# Patient Record
Sex: MALE | Race: WHITE | NOT HISPANIC OR LATINO | Employment: FULL TIME | URBAN - METROPOLITAN AREA
[De-identification: names, ages, dates, MRNs, and addresses within clinical notes are randomized per-mention and may not be internally consistent; named-entity substitution may affect disease eponyms.]

---

## 2017-12-16 ENCOUNTER — OFFICE VISIT (OUTPATIENT)
Dept: URGENT CARE | Facility: URGENT CARE | Age: 32
End: 2017-12-16
Payer: COMMERCIAL

## 2017-12-16 ENCOUNTER — RADIANT APPOINTMENT (OUTPATIENT)
Dept: GENERAL RADIOLOGY | Facility: CLINIC | Age: 32
End: 2017-12-16
Attending: FAMILY MEDICINE
Payer: COMMERCIAL

## 2017-12-16 VITALS
TEMPERATURE: 98.4 F | DIASTOLIC BLOOD PRESSURE: 70 MMHG | WEIGHT: 170 LBS | OXYGEN SATURATION: 96 % | SYSTOLIC BLOOD PRESSURE: 126 MMHG | BODY MASS INDEX: 22.53 KG/M2 | HEART RATE: 88 BPM | HEIGHT: 73 IN

## 2017-12-16 DIAGNOSIS — S93.401A SPRAIN OF RIGHT ANKLE, UNSPECIFIED LIGAMENT, INITIAL ENCOUNTER: Primary | ICD-10-CM

## 2017-12-16 PROCEDURE — 99213 OFFICE O/P EST LOW 20 MIN: CPT | Performed by: FAMILY MEDICINE

## 2017-12-16 PROCEDURE — 73610 X-RAY EXAM OF ANKLE: CPT | Mod: RT

## 2017-12-16 NOTE — NURSING NOTE
"Jesus Manuel Morgan;   Chief Complaint   Patient presents with     Musculoskeletal Problem     slipped on ice this am, bend right foot back with all his weight on foot, pain in foot,ankle and up lower leg      Urgent Care     Initial /70 (BP Location: Right arm, Patient Position: Chair, Cuff Size: Adult Regular)  Pulse 88  Temp 98.4  F (36.9  C) (Oral)  Ht 6' 1\" (1.854 m)  Wt 170 lb (77.1 kg)  SpO2 96%  BMI 22.43 kg/m2 Estimated body mass index is 22.43 kg/(m^2) as calculated from the following:    Height as of this encounter: 6' 1\" (1.854 m).    Weight as of this encounter: 170 lb (77.1 kg)..  BP completed using cuff size regular.  Мария Ceron R.N.  "

## 2017-12-16 NOTE — PROGRESS NOTES
Subjective: Patient slipped on the ice and his right ankle got caught under him in excess plantar flexion, could not bear weight afterwards, lateral malleolar pain.    Objective: He has good range of motion in the foot and he can move the toes without difficulty or pain but eversion of the ankle is painful and lateral malleolus is moderately swollen.  The ankle joint appears to be stable.  X-rays do not show any fracture.  They will be read by radiology as well    Assessment and plan: Right ankle sprain.  Nonweightbearing until he can start bearing some weight, cast boot could be helpful.His high-top sneakers are ok for now

## 2017-12-16 NOTE — MR AVS SNAPSHOT
"              After Visit Summary   2017    Jesus Manuel Morgan    MRN: [id number]           Patient Information     Date Of Birth          1985        Visit Information        Provider Department      2017 3:20 PM Pradip Patel MD [facility]        Today's Diagnoses     Sprain of right ankle, unspecified ligament, initial encounter    -  1       Follow-ups after your visit        Who to contact     If you have questions or need follow up information about today's clinic visit or your schedule please contact [facility] directly at [phone].  Normal or non-critical lab and imaging results will be communicated to you by Chamatehart, letter or phone within 4 business days after the clinic has received the results. If you do not hear from us within 7 days, please contact the clinic through Chamatehart or phone. If you have a critical or abnormal lab result, we will notify you by phone as soon as possible.  Submit refill requests through sambaash or call your pharmacy and they will forward the refill request to us. Please allow 3 business days for your refill to be completed.          Additional Information About Your Visit        MyChart Information     sambaash lets you send messages to your doctor, view your test results, renew your prescriptions, schedule appointments and more. To sign up, go to [email or web address] . Click on \"Log in\" on the left side of the screen, which will take you to the Welcome page. Then click on \"Sign up Now\" on the right side of the page.     You will be asked to enter the access code listed below, as well as some personal information. Please follow the directions to create your username and password.     Your access code is: [email or web address]  Expires: 3/16/2018  3:57 PM     Your access code will  in 90 days. If you need help or a new code, please call your [facility] clinic or [phone].        Care EveryWhere ID     " "This is your Care EveryWhere ID. This could be used by other organizations to access your [facility] medical records  [id number]        Your Vitals Were     Pulse Temperature Height Pulse Oximetry BMI (Body Mass Index)       88 98.4  F (36.9  C) (Oral) 6' 1\" (1.854 m) 96% 22.43 kg/m2        Blood Pressure from Last 3 Encounters:   12/16/17 126/70   10/30/16 120/59   10/27/14 134/74    Weight from Last 3 Encounters:   12/16/17 170 lb (77.1 kg)   10/30/16 171 lb 9 oz (77.8 kg)   10/27/14 170 lb (77.1 kg)              We Performed the Following     XR Ankle Right G/E 3 Views          Today's Medication Changes          These changes are accurate as of: 12/16/17  3:57 PM.  If you have any questions, ask your nurse or doctor.               Stop taking these medicines if you haven't already. Please contact your care team if you have questions.     fluticasone 50 MCG/ACT spray   Commonly known as:  FLONASE   Stopped by:  Pradip Patel MD                    Primary Care Provider Fax #    Physician No Ref-Primary [phone]       No address on file        Equal Access to Services     HANNA KAUFFMAN AH: Billy Zarate, wajarvis priest, qasusan kaalmakaruna rendon, zaira gupta. So [facility] [phone].    ATENCIÓN: Si habla español, tiene a guerrero disposición servicios gratuitos de asistencia lingüística. Shira al [phone].    We comply with applicable federal civil rights laws and [place] laws. We do not discriminate on the basis of race, color, national origin, age, disability, sex, sexual orientation, or gender identity.            Thank you!     Thank you for choosing [facility]  for your care. Our goal is always to provide you with excellent care. Hearing back from our patients is one way we can continue to improve our services. Please take a few minutes to complete the written survey that you may receive in the mail after your visit with us. Thank " you!             Your Updated Medication List - Protect others around you: Learn how to safely use, store and throw away your medicines at www.disposemymeds.org.      Notice  As of 12/16/2017  3:57 PM    You have not been prescribed any medications.

## 2021-07-23 ENCOUNTER — OFFICE VISIT (OUTPATIENT)
Dept: URGENT CARE | Facility: URGENT CARE | Age: 36
End: 2021-07-23
Payer: COMMERCIAL

## 2021-07-23 VITALS
SYSTOLIC BLOOD PRESSURE: 130 MMHG | DIASTOLIC BLOOD PRESSURE: 86 MMHG | HEIGHT: 73 IN | BODY MASS INDEX: 26.51 KG/M2 | OXYGEN SATURATION: 99 % | WEIGHT: 200 LBS | HEART RATE: 61 BPM | TEMPERATURE: 98.4 F

## 2021-07-23 DIAGNOSIS — N50.812 TESTICULAR PAIN, LEFT: Primary | ICD-10-CM

## 2021-07-23 LAB
ALBUMIN UR-MCNC: NEGATIVE MG/DL
APPEARANCE UR: CLEAR
BILIRUB UR QL STRIP: NEGATIVE
COLOR UR AUTO: YELLOW
GLUCOSE UR STRIP-MCNC: NEGATIVE MG/DL
HGB UR QL STRIP: NEGATIVE
KETONES UR STRIP-MCNC: NEGATIVE MG/DL
LEUKOCYTE ESTERASE UR QL STRIP: NEGATIVE
NITRATE UR QL: NEGATIVE
PH UR STRIP: 6.5 [PH] (ref 5–7)
SP GR UR STRIP: 1.01 (ref 1–1.03)
UROBILINOGEN UR STRIP-ACNC: 0.2 E.U./DL

## 2021-07-23 PROCEDURE — 99203 OFFICE O/P NEW LOW 30 MIN: CPT | Performed by: FAMILY MEDICINE

## 2021-07-23 PROCEDURE — 81003 URINALYSIS AUTO W/O SCOPE: CPT | Performed by: FAMILY MEDICINE

## 2021-07-23 ASSESSMENT — MIFFLIN-ST. JEOR: SCORE: 1891.07

## 2021-07-24 NOTE — PROGRESS NOTES
Continue current management   F/U INR in 2 weeks Subjective: Patient reports that starting a couple of hours ago out of the blue he started having intermittent stabbing pains in the left testicle that would be quite momentary, sometimes sort of dull and sometimes quite sharp, not related to movement, has never had this before, and something feels a little odd when he urinates but he would not call it pain.  He has no risk for STDs.  Exercises biking but nothing unusual lately    Objective: Genital exam is quite normal, testicles not tender right now, no hernia felt.  Urine is normal.    Assessment and plan: Intermittent left testicular pain out of the blue, no obvious explanation.  Testicular torsion seems very unlikely, epididymitis seems very unlikely, start of her hernia seems unlikely as well.  I think he should take some ibuprofen and monitor symptoms for a while.  If it goes away I do not think he needs to worry.  If it were to be a torsion it would become a constant pain and I instructed him to go to the emergency room if that happens.

## 2021-09-05 ENCOUNTER — HEALTH MAINTENANCE LETTER (OUTPATIENT)
Age: 36
End: 2021-09-05

## 2022-10-23 ENCOUNTER — HEALTH MAINTENANCE LETTER (OUTPATIENT)
Age: 37
End: 2022-10-23

## 2023-09-11 ENCOUNTER — HOSPITAL ENCOUNTER (EMERGENCY)
Facility: CLINIC | Age: 38
Discharge: HOME | End: 2023-09-12
Attending: EMERGENCY MEDICINE | Admitting: EMERGENCY MEDICINE
Payer: COMMERCIAL

## 2023-09-11 VITALS
RESPIRATION RATE: 14 BRPM | HEART RATE: 69 BPM | SYSTOLIC BLOOD PRESSURE: 114 MMHG | OXYGEN SATURATION: 97 % | BODY MASS INDEX: 24.39 KG/M2 | TEMPERATURE: 98.3 F | WEIGHT: 184 LBS | DIASTOLIC BLOOD PRESSURE: 73 MMHG | HEIGHT: 73 IN
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
## 2023-09-11 PROCEDURE — 99283 EMERGENCY DEPT VISIT LOW MDM: CPT | Performed by: EMERGENCY MEDICINE

## 2023-09-11 PROCEDURE — 99284 EMERGENCY DEPT VISIT MOD MDM: CPT | Performed by: EMERGENCY MEDICINE

## 2023-09-11 ASSESSMENT — ACTIVITIES OF DAILY LIVING (ADL): ADLS_ACUITY_SCORE: 33

## 2023-09-12 PROCEDURE — 250N000013 HC RX MED GY IP 250 OP 250 PS 637: Performed by: EMERGENCY MEDICINE

## 2023-09-12 RX ADMIN — AMOXICILLIN AND CLAVULANATE POTASSIUM 1 TABLET: 875; 125 TABLET, FILM COATED ORAL at 00:32

## 2023-09-12 NOTE — DISCHARGE INSTRUCTIONS
Be sure to keep area clean, cleansed with soap and water.  Apply thin layer of topical antibiotic ointment (bacitracin, Neosporin, or triple antibiotic ointment).  Please take oral antibiotics as prescribed.  Return to the ED with any signs of infection.

## 2023-09-12 NOTE — ED TRIAGE NOTES
Patient presents due to dog bite from neighbors dog. Patient has dog bite to right upper arm. Neighbor showed proof of vaccination for dog. Patient reports 3/10 throbbing pain.     Triage Assessment       Row Name 09/11/23 2520       Triage Assessment (Adult)    Airway WDL WDL       Respiratory WDL    Respiratory WDL WDL       Skin Circulation/Temperature WDL    Skin Circulation/Temperature WDL X  dog bite to right upper arm       Cardiac WDL    Cardiac WDL WDL       Peripheral/Neurovascular WDL    Peripheral Neurovascular WDL WDL       Cognitive/Neuro/Behavioral WDL    Cognitive/Neuro/Behavioral WDL WDL

## 2023-09-12 NOTE — ED PROVIDER NOTES
"ED Provider Note  [facility]      History     Chief Complaint   Patient presents with    Dog Bite     Patient presents due to dog bite from neighbors dog. Patient has dog bite to right upper arm. Neighbor showed proof of vaccination for dog. Patient reports 3/10 throbbing pain.     HPI  Jesus Manuel Morgan is a previously healthy 38 year old male who presents to the Emergency Department seeking evaluation [facility] He reports that he was in his backyard when his neighbor's poodle jumped the fence and bit his right elbow. He is unsure of the dog's vaccination status at this time. His Tdap is up to date 4/14/2017.      Past Medical History  History reviewed. No pertinent past medical history.  History reviewed. No pertinent surgical history.  amoxicillin-clavulanate (AUGMENTIN) 875-125 MG tablet      Allergies   Allergen Reactions    Seasonal Allergies     Sulfa Antibiotics Rash     Family History  History reviewed. No pertinent family history.  Social History   Social History     Tobacco Use    Smoking status: Never    Smokeless tobacco: Never   Substance Use Topics    Alcohol use: Yes     Comment: occasional    Drug use: Yes     Types: Marijuana     Comment: occasional      Past medical history, past surgical history, medications, allergies, family history, and social history were reviewed with the patient. No additional pertinent items.      A medically appropriate review of systems was performed with pertinent positives and negatives noted in the HPI, and all other systems negative.    Physical Exam   BP: 114/73  Pulse: 69  Temp: 98.3  F (36.8  C)  Resp: 14  Height: 185.4 cm (6' 1\")  Weight: 83.5 kg (184 lb)  SpO2: 97 %  Physical Exam  Vitals and nursing note reviewed.   Constitutional:       General: He is not in acute distress.     Appearance: Normal appearance.   HENT:      Head: Normocephalic.      Nose: Nose normal.   Eyes:      Pupils: " Pupils are equal, round, and reactive to light.   Cardiovascular:      Rate and Rhythm: Normal rate and regular rhythm.   Pulmonary:      Effort: Pulmonary effort is normal.   Abdominal:      General: There is no distension.   Musculoskeletal:         General: No deformity. Normal range of motion.        Arms:       Cervical back: Normal range of motion.   Skin:     General: Skin is warm.   Neurological:      Mental Status: He is alert and oriented to person, place, and time.   Psychiatric:         Mood and Affect: Mood normal.           ED Course, Procedures, & Data             No results found for any visits on 09/11/23.  Medications   amoxicillin-clavulanate (AUGMENTIN) 875-125 MG per tablet 1 tablet (1 tablet Oral $Given 9/12/23 0032)     Labs Ordered and Resulted from Time of ED Arrival to Time of ED Departure - No data to display  No orders to display          Critical care was not performed.     Medical Decision Making  The patient's presentation was of moderate complexity (an acute complicated injury).    The patient's evaluation involved:  review of external note(s) from 1 sources ([facility], tetanus is up to date)    The patient's management necessitated moderate risk (prescription drug management including medications given in the ED).    Assessment & Plan    Patient presents the ED with bite wound to his right upper arm.  Offending dog is up-to-date on vaccinations.  Patient is up-to-date on tetanus.  Wound was cleansed extensively with soap and water.  No indication for closure.  Bacitracin applied.  Patient was started on Augmentin, first dose given in the ED.  Educated on reasons to return to the ED.    I have reviewed the nursing notes. I have reviewed the findings, diagnosis, plan and need for follow up with the patient.    Discharge Medication List as of 9/12/2023 12:43 AM        START taking these medications    Details   amoxicillin-clavulanate (AUGMENTIN) 875-125 MG tablet Take 1 tablet by mouth 2  times daily, Disp-14 tablet, R-0, E-Prescribe             Final diagnoses:   Dog bite, initial encounter   I, Po Pritchett, am serving as a trained medical scribe to document services personally performed by Isra Hu DO, based on the provider's statements to me.     I, Isra Hu DO, was physically present and have reviewed and verified the accuracy of this note documented by Po Pritchett.      Isra Hu DO  [facility] EMERGENCY DEPARTMENT  9/11/2023     Isra Hu DO  09/12/23 0051

## 2024-01-14 ENCOUNTER — HEALTH MAINTENANCE LETTER (OUTPATIENT)
Age: 39
End: 2024-01-14

## 2025-01-26 ENCOUNTER — HEALTH MAINTENANCE LETTER (OUTPATIENT)
Age: 40
End: 2025-01-26